# Patient Record
Sex: FEMALE | Race: WHITE | URBAN - METROPOLITAN AREA
[De-identification: names, ages, dates, MRNs, and addresses within clinical notes are randomized per-mention and may not be internally consistent; named-entity substitution may affect disease eponyms.]

---

## 2022-03-30 ENCOUNTER — APPOINTMENT (RX ONLY)
Dept: URBAN - METROPOLITAN AREA CLINIC 308 | Facility: CLINIC | Age: 67
Setting detail: DERMATOLOGY
End: 2022-03-30

## 2022-03-30 PROBLEM — C44.311 BASAL CELL CARCINOMA OF SKIN OF NOSE: Status: ACTIVE | Noted: 2022-03-30

## 2022-03-30 PROCEDURE — ? REPAIR NOTE

## 2022-03-30 PROCEDURE — 17311 MOHS 1 STAGE H/N/HF/G: CPT

## 2022-03-30 PROCEDURE — ? MOHS SURGERY

## 2022-03-30 PROCEDURE — 15260 FTH/GFT FR N/E/E/L 20 SQCM/<: CPT

## 2022-03-30 NOTE — PROCEDURE: REPAIR NOTE
Graft Donor Site Bandage (Optional-Leave Blank If You Don't Want In Note): Xeroform and gauze tie-over bolster.

## 2022-03-30 NOTE — PROCEDURE: MIPS QUALITY
Quality 110: Preventive Care And Screening: Influenza Immunization: Influenza Immunization Administered during Influenza season
Quality 431: Preventive Care And Screening: Unhealthy Alcohol Use - Screening: Patient not identified as an unhealthy alcohol user when screened for unhealthy alcohol use using a systematic screening method
Quality 130: Documentation Of Current Medications In The Medical Record: Current Medications Documented
Quality 111:Pneumonia Vaccination Status For Older Adults: Pneumococcal Vaccination Previously Received
Quality 226: Preventive Care And Screening: Tobacco Use: Screening And Cessation Intervention: Patient screened for tobacco use and is an ex/non-smoker
Detail Level: Detailed

## 2022-03-30 NOTE — PROCEDURE: MOHS SURGERY

## 2022-03-30 NOTE — PROCEDURE: REPAIR NOTE
Discharge instructions given, instructed to follow up in clinic with Dr. Youngblood no later than Thursday.  Discussed SROM, labor signs.  Pt verbalized understanding of conditions to come back to the hospital.  Denied further questions.  Left with all personal belongings ambulatory.     Mercedes Flap Text: The defect edges were debeveled with a #15 scalpel blade.  Given the location of the defect, shape of the defect and the proximity to free margins a Mercedes flap was deemed most appropriate.  Using a sterile surgical marker, an appropriate advancement flap was drawn incorporating the defect and placing the expected incisions within the relaxed skin tension lines where possible. The area thus outlined was incised deep to adipose tissue with a #15 scalpel blade.  The skin margins were undermined to an appropriate distance in all directions utilizing iris scissors.

## 2022-03-30 NOTE — PROCEDURE: REPAIR NOTE
Hatchet Flap Text: The defect edges were debeveled with a #15 scalpel blade.  Given the location of the defect, shape of the defect and the proximity to free margins a hatchet flap was deemed most appropriate.  Using a sterile surgical marker, an appropriate hatchet flap was drawn incorporating the defect and placing the expected incisions within the inferior melolabial crease, with the SCD medially and inferiorly marked.    The area thus outlined was incised deep to adipose tissue with a #15 scalpel blade.  The skin margins were undermined to an appropriate distance in all directions utilizing iris scissors superficial to the orbicularis oris muscle.

## 2023-06-19 NOTE — PROCEDURE: MOHS SURGERY
Chief Complaint   Patient presents with    Follow-up    Incontinence     Patient reports she still has some leaks. She saw Joanna Essex on 5.24.23 for this. HPI: Brenda Velásquez [de-identified] y.o. female presenting for       Right shoulder pain   Chronic issue   Reports that her muscles are aching   Patient reports that she was given prednisone and percocet which did help   Patient would like to try a muscle relaxant         Urine incontinence   Still having issues   Was seen by another provider   UTI was reuled out   Has not tried any medications. Patient does not want to try anything at this time. Left ankle and foot pain   Going onfor several of weeks after sustaining a fall in her home   Patinet was taking the laundry down to e basement when she feel down the steps. No xrays were done   Complainings of swelling and pain in the area   Has a history of osteopenia. Patient was in the ED - shown to have a fracture of the nares. Still has bruising in the face. F/u   Patient was given a boot but it is too painful   Tishtnet is seeing podiatry and has an MRI scheduled tomorrow. Unable to sleep at night due to the pain. F/u   MRI showed normal MRI of the ankle. Did showed a simple sebaceous cyst and some soft tissue swelling. . Otherwise normal.    Still having pain in the area. Takes ibuprofen 400 mg which does help but does not last for long. F/u   Doing better admits to some swelling but is tolerated. Tobacco abuse   Patient smokes about 10 cigarettes a day. Would like to quit. Patient reports in the past she was using nicotine patches but developed a little rash. Patient would like to try nicotine patches again because it did help her. F/u   Is smoking less       History of melanoma   Followed by dermatology. Patient tries to avoid the sun due to history of melanoma.      Constipation   Has hard stools   Has a bowel movent every 4-5 days   Does not tolerate the Otolaryngologist Procedure Text (A): After obtaining clear surgical margins the patient was sent to otolaryngology for surgical repair.  The patient understands they will receive post-surgical care and follow-up from the referring physician's office.